# Patient Record
Sex: FEMALE | Race: ASIAN | NOT HISPANIC OR LATINO | Employment: UNEMPLOYED | URBAN - METROPOLITAN AREA
[De-identification: names, ages, dates, MRNs, and addresses within clinical notes are randomized per-mention and may not be internally consistent; named-entity substitution may affect disease eponyms.]

---

## 2019-06-18 PROBLEM — J30.1 SEASONAL ALLERGIC RHINITIS DUE TO POLLEN: Status: ACTIVE | Noted: 2019-06-18

## 2019-06-18 PROBLEM — J34.3 NASAL TURBINATE HYPERTROPHY: Status: ACTIVE | Noted: 2019-06-18

## 2019-06-18 PROBLEM — J35.1 TONSILLAR HYPERTROPHY: Status: ACTIVE | Noted: 2019-06-18

## 2019-06-18 PROBLEM — J34.89 NASAL OBSTRUCTION: Status: ACTIVE | Noted: 2019-06-18

## 2019-06-18 PROBLEM — J34.2 DEVIATED NASAL SEPTUM: Status: ACTIVE | Noted: 2019-06-18

## 2019-06-18 PROBLEM — R05.9 COUGH: Status: ACTIVE | Noted: 2019-06-18

## 2019-06-18 PROBLEM — J45.991 COUGH VARIANT ASTHMA: Status: ACTIVE | Noted: 2019-06-18

## 2022-05-31 ENCOUNTER — TELEPHONE (OUTPATIENT)
Dept: MATERNAL FETAL MEDICINE | Facility: CLINIC | Age: 32
End: 2022-05-31
Payer: COMMERCIAL

## 2022-05-31 NOTE — TELEPHONE ENCOUNTER
Called and left a voicemail to schedule the pt for a virtual consult with Eileen re: abdominal cerclage.

## 2022-06-06 ENCOUNTER — TELEPHONE (OUTPATIENT)
Dept: MATERNAL FETAL MEDICINE | Facility: CLINIC | Age: 32
End: 2022-06-06
Payer: COMMERCIAL

## 2022-06-24 ENCOUNTER — PATIENT MESSAGE (OUTPATIENT)
Dept: MATERNAL FETAL MEDICINE | Facility: CLINIC | Age: 32
End: 2022-06-24
Payer: COMMERCIAL

## 2022-07-26 ENCOUNTER — OFFICE VISIT (OUTPATIENT)
Dept: MATERNAL FETAL MEDICINE | Facility: CLINIC | Age: 32
End: 2022-07-26
Payer: COMMERCIAL

## 2022-07-26 ENCOUNTER — TELEPHONE (OUTPATIENT)
Dept: OBSTETRICS AND GYNECOLOGY | Facility: CLINIC | Age: 32
End: 2022-07-26
Payer: COMMERCIAL

## 2022-07-26 DIAGNOSIS — Z31.69 ENCOUNTER FOR PRECONCEPTION CONSULTATION: ICD-10-CM

## 2022-07-26 PROCEDURE — 99203 OFFICE O/P NEW LOW 30 MIN: CPT | Mod: 95,,, | Performed by: OBSTETRICS & GYNECOLOGY

## 2022-07-26 PROCEDURE — 99203 PR OFFICE/OUTPT VISIT, NEW, LEVL III, 30-44 MIN: ICD-10-PCS | Mod: 95,,, | Performed by: OBSTETRICS & GYNECOLOGY

## 2022-07-26 NOTE — PROGRESS NOTES
MATERNAL-FETAL MEDICINE   CONSULT NOTE    Provider requesting consultation: Dr. Andria Mascorro    SUBJECTIVE:     Ms. Franko Moore is a 31 y.o.  female with IUP at Unknown who is seen in consultation by MFM for evaluation and management of:  Problem   Preconception consultation for cervical insufficiency            No current medications    Review of patient's allergies indicates:  No Known Allergies    PMH:past history of asthma    PObHx:  OB History    Para Term  AB Living   2 1 0 1 1 1   SAB IAB Ectopic Multiple Live Births           1      # Outcome Date GA Lbr Zaid/2nd Weight Sex Delivery Anes PTL Lv   2  2018 28w5d  1.049 kg (2 lb 5 oz) M Vag-Spont  Y KENDELL      Birth Comments: Had cervical dilation at 15 weeks. Rescue cerclage placed. Developed PPROM at 26 weeks. Labored and delivered at 28 weeks   1 AB 2016 16w0d       ND      Birth Comments: Presented with cervical dilation and delivered before cerclage could be placed       PSH:  Past Surgical History:   Procedure Laterality Date    CERVICAL CERCLAGE  2018    Placed at 15 weeks due to cervical shortening and dilation         Social history: reports that she has never smoked. She has never used smokeless tobacco. She reports that she does not drink alcohol and does not use drugs.    Genetic history: The patient denies any inherited genetic diseases or birth defects in herself or her partner's personal history or family.        ASSESSMENT/PLAN:     31 y.o.  female with IUP at Unknown     Preconception consultation for cervical insufficiency  The patient was seen in consultation for pre conception counseling regarding cervical insufficiency and recurrent mid trimester pregnancy complications.    Her 1st pregnancy was in 2016. At approximately 16 weeks of gestation she developed spotting and was found to be several cm dilated.  She had not had any contractions prior to that.  She was sent to Labor and delivery for monitoring  and consideration of a cerclage, but before the cerclage could be placed, she ended up delivering.  The only time that she had contractions was just prior to delivery.  Her 2nd pregnancy was in 2018 and in that pregnancy at approximately 15 weeks of gestation was noted to have cervical shortening and dilation.  She underwent a rescue cerclage placement and was started on 17-P injections weekly.  She was also placed on bed rest.  At 26 weeks of gestation she experienced premature rupture of membranes.  She was admitted for expectant management and ultimately delivered at 28 weeks and 5 days her son weighed 2 lb and 5 oz and despite a prolonged NICU course has overall done fairly well.  He does still have some sequelae of prematurity but he is hitting all of his developmental milestones.    We discussed that based on her history she clearly has cervical insufficiency.  We discussed options for further management.  We discussed that one option would be placement of a prophylactic Martins cerclage at 12 to 14 weeks.  I explained that this is an option that we would expect to have a success rate of around 70% based on her history, but there does remain a failure rate and it is no guarantee of a term delivery.  I explained that the advantage of a vaginal approach is less surgical morbidity at the time of placement as well as less morbidity should the sutures need to be removed due to pregnancy complications or  labor.  An alternative approach would be placement of an abdominal cerclage.  I explained that this is associated with an increased risk of surgical morbidity because it typically involves 2 surgical procedures-1 to place the suture in 1 for delivery or suture removal.  We also discussed placement of the abdominal cerclage during pregnancy via an open approach versus interval placement of the abdominal cerclage via a robotic/minimally invasive approach.  We discussed surgical risks such as bleeding,  infection, damage to the surrounding viscera as well as the potential for adhesion formation.  I also explained that I typically advise patient to have an abdominal cerclage that achieves a gestational age of at least 30 weeks to leave the cerclage in place until they are done with childbearing.  Based on the results of the MAVRIC study, I explained her that an abdominal cerclage has approximately an 80% success rate.  I explained however that she is a bit different than patients who were included in that study because she has had only 1 failed cerclage, and it was a rescue cerclage.    We discussed at length both options.  I explained that while she does not meet firm criteria for an abdominal cerclage, given her history of such early and significant cervical dilation as well as the fact that even despite cerclage placement she still had mid trimester pregnancy complications, abdominal cerclage is reasonable to consider.  Furthermore, when you consider all of the stress involved in both prior pregnancies and the stress of having a 28 week infant, her desire for a more aggressive approach with abdominal cerclage is certainly justifiable and within a reasonable standard.  After considering all of her options and doing extensive research, she has decided that she would like to proceed with an abdominal cerclage placed during the inter pregnancy interval and I am going to get her set up to see Dr. Devi Soriano to arrange for robotic cerclage placement.    I also explained to her that while the data are mixed, I would recommend beginning vaginal progesterone 200 mg at 16 weeks as an alternative to 17P in any future pregnancy based on the recent meta analysis that suggests slightly more benefit with vaginal as opposed to intramuscular progesterone in the setting of cervical shortening.          Each patient to whom he or she provides medical services by telemedicine is:  (1) informed of the relationship between the  physician and patient and the respective role of any other health care provider with respect to management of the patient; and (2) notified that he or she may decline to receive medical services by telemedicine and may withdraw from such care at any time.    Telemedicine MFM Ultrasound Note    Consultation started:  07/26/2022  at 1000  The chief complaint leading to consultation is: cervical insufficiency  The patient location is: home     Consultation ended: 07/26/2022 at 1025.    Total time spent with patient: < 30 Minutes    Consulting clinician was informed of the encounter and consult note.    45 minutes of total time spent on the encounter, which includes face to face time and non-face to face time preparing to see the patient (eg, review of tests), obtaining and/or reviewing separately obtained history, documenting clinical information in the electronic or other health record, independently interpreting results (not separately reported) and communicating results to the patient/family/caregiver, or care coordination (not separately reported).      Mohan Arellano  Maternal-Fetal Medicine    Electronically Signed by Mohan Arellano July 26, 2022

## 2022-07-26 NOTE — ASSESSMENT & PLAN NOTE
The patient was seen in consultation for pre conception counseling regarding cervical insufficiency and recurrent mid trimester pregnancy complications.    Her 1st pregnancy was in 2016. At approximately 16 weeks of gestation she developed spotting and was found to be several cm dilated.  She had not had any contractions prior to that.  She was sent to Labor and delivery for monitoring and consideration of a cerclage, but before the cerclage could be placed, she ended up delivering.  The only time that she had contractions was just prior to delivery.  Her 2nd pregnancy was in 2018 and in that pregnancy at approximately 15 weeks of gestation was noted to have cervical shortening and dilation.  She underwent a rescue cerclage placement and was started on 17-P injections weekly.  She was also placed on bed rest.  At 26 weeks of gestation she experienced premature rupture of membranes.  She was admitted for expectant management and ultimately delivered at 28 weeks and 5 days her son weighed 2 lb and 5 oz and despite a prolonged NICU course has overall done fairly well.  He does still have some sequelae of prematurity but he is hitting all of his developmental milestones.    We discussed that based on her history she clearly has cervical insufficiency.  We discussed options for further management.  We discussed that one option would be placement of a prophylactic Martins cerclage at 12 to 14 weeks.  I explained that this is an option that we would expect to have a success rate of around 70% based on her history, but there does remain a failure rate and it is no guarantee of a term delivery.  I explained that the advantage of a vaginal approach is less surgical morbidity at the time of placement as well as less morbidity should the sutures need to be removed due to pregnancy complications or  labor.  An alternative approach would be placement of an abdominal cerclage.  I explained that this is associated with an  increased risk of surgical morbidity because it typically involves 2 surgical procedures-1 to place the suture in 1 for delivery or suture removal.  We also discussed placement of the abdominal cerclage during pregnancy via an open approach versus interval placement of the abdominal cerclage via a robotic/minimally invasive approach.  We discussed surgical risks such as bleeding, infection, damage to the surrounding viscera as well as the potential for adhesion formation.  I also explained that I typically advise patient to have an abdominal cerclage that achieves a gestational age of at least 30 weeks to leave the cerclage in place until they are done with childbearing.  Based on the results of the MAVRIC study, I explained her that an abdominal cerclage has approximately an 80% success rate.  I explained however that she is a bit different than patients who were included in that study because she has had only 1 failed cerclage, and it was a rescue cerclage.    We discussed at length both options.  I explained that while she does not meet firm criteria for an abdominal cerclage, given her history of such early and significant cervical dilation as well as the fact that even despite cerclage placement she still had mid trimester pregnancy complications, abdominal cerclage is reasonable to consider.  Furthermore, when you consider all of the stress involved in both prior pregnancies and the stress of having a 28 week infant, her desire for a more aggressive approach with abdominal cerclage is certainly justifiable and within a reasonable standard.  After considering all of her options and doing extensive research, she has decided that she would like to proceed with an abdominal cerclage placed during the inter pregnancy interval and I am going to get her set up to see Dr. Devi Soriano to arrange for robotic cerclage placement.    I also explained to her that while the data are mixed, I would recommend beginning vaginal  progesterone 200 mg at 16 weeks as an alternative to 17P in any future pregnancy based on the recent meta analysis that suggests slightly more benefit with vaginal as opposed to intramuscular progesterone in the setting of cervical shortening.

## 2022-08-23 ENCOUNTER — OFFICE VISIT (OUTPATIENT)
Dept: OBSTETRICS AND GYNECOLOGY | Facility: CLINIC | Age: 32
End: 2022-08-23
Payer: COMMERCIAL

## 2022-08-23 DIAGNOSIS — Z87.51 HISTORY OF PRETERM DELIVERY: ICD-10-CM

## 2022-08-23 DIAGNOSIS — N88.3 CERVICAL INCOMPETENCE: Primary | ICD-10-CM

## 2022-08-23 PROCEDURE — 99214 OFFICE O/P EST MOD 30 MIN: CPT | Mod: 95,,, | Performed by: OBSTETRICS & GYNECOLOGY

## 2022-08-23 PROCEDURE — 99214 PR OFFICE/OUTPT VISIT, EST, LEVL IV, 30-39 MIN: ICD-10-PCS | Mod: 95,,, | Performed by: OBSTETRICS & GYNECOLOGY

## 2022-08-23 NOTE — PROGRESS NOTES
The patient location is:  home ([place], [place])  The chief complaint leading to consultation is:  Poor obstetric history/history of cervical incompetence and  cervical dilation    Visit type: audiovisual    Face to Face time with patient: 25  35 minutes of total time spent on the encounter, which includes face to face time and non-face to face time preparing to see the patient (eg, review of tests), Obtaining and/or reviewing separately obtained history, Documenting clinical information in the electronic or other health record, Independently interpreting results (not separately reported) and communicating results to the patient/family/caregiver, or Care coordination (not separately reported).     Each patient to whom he or she provides medical services by telemedicine is:  (1) informed of the relationship between the physician and patient and the respective role of any other health care provider with respect to management of the patient; and (2) notified that he or she may decline to receive medical services by telemedicine and may withdraw from such care at any time.    Notes:     Consultation Note:    Reason for Consultation:  Evaluate for interval placement/minimally invasive abdominal cerclage    Consulting Physician:  Dr. Mohan Arellano, Dr. Andria Mascorro     Franko Moore is a 31 y.o.   patient who presents via telemedicine visit for discussion of risk and benefits of minimally invasive interval abdominal cerclage placement.   Patient with poor obstetric history including cervical dilatation at 15 weeks followed by rescue cerclage with PPROM at 26 weeks gestation followed by delivery at 28 weeks gestation.  This delivery resulted in an infant boy weighing 2 lb and 5 oz.  this  had a long/prolonged NICU stay and has done well overall.    Chart Reviewed/Dr. Arellano's consultation report reviewed    No past medical history on file.  Past Surgical History:   Procedure Laterality Date     CERVICAL CERCLAGE  2018    Placed at 15 weeks due to cervical shortening and dilation     Social History     Socioeconomic History    Marital status:    Tobacco Use    Smoking status: Never Smoker    Smokeless tobacco: Never Used   Substance and Sexual Activity    Alcohol use: Never    Drug use: Never     Family History   Problem Relation Age of Onset    Genetic Disorder Neg Hx     Chromosomal disorder Neg Hx     Birth defects Neg Hx      OB History        2    Para   1    Term   0       1    AB   1    Living   1       SAB        IAB        Ectopic        Multiple        Live Births   1                 There were no vitals taken for this visit.      ROS:  GENERAL: Denies weight gain or weight loss. Feeling well overall.   SKIN: Denies rash or lesions.   HEAD: Denies head injury or headache.   NODES: Denies enlarged lymph nodes.   CHEST: Denies chest pain or shortness of breath.   CARDIOVASCULAR: Denies palpitations or left sided chest pain.   ABDOMEN: No abdominal pain, constipation, diarrhea, nausea, vomiting or rectal bleeding.   URINARY: No frequency, dysuria, hematuria, or burning on urination.  REPRODUCTIVE: See HPI.   BREASTS: The patient performs breast self-examination and denies pain, lumps, or nipple discharge.   HEMATOLOGIC: No easy bruisability or excessive bleeding.   MUSCULOSKELETAL: Denies joint pain or swelling.   NEUROLOGIC: Denies syncope or weakness.   PSYCHIATRIC: Denies depression, anxiety or mood swings.    PHYSICAL EXAM:  Deferred/telemedicine visit    Impression:  1. Cervical incompetence     2. History of  delivery         Plan:  Discussed interval robotic assisted abdominal cerclage placement versus abdominal cerclage placement.  I have recommended Interval placement before pregnancy if desired.  Once pregnant, abdominal (laparotomy) cerclage placement would be only option.      Minimally invasive cerclage placement procedure reviewed in detail with  patient.  Patient verbalized understanding of this discussion and all questions were answered.    Consulting Physician to be notified of above findings/plan.    Billing based on face-to-face consultation time/telemedicine visit/chart reviewing chart completion: 35 minutes.    Lalo Soriano Iv, MD

## 2022-08-30 ENCOUNTER — TELEPHONE (OUTPATIENT)
Dept: OBSTETRICS AND GYNECOLOGY | Facility: CLINIC | Age: 32
End: 2022-08-30
Payer: COMMERCIAL

## 2023-12-26 ENCOUNTER — PATIENT MESSAGE (OUTPATIENT)
Dept: OBSTETRICS AND GYNECOLOGY | Facility: CLINIC | Age: 33
End: 2023-12-26
Payer: COMMERCIAL

## 2023-12-29 ENCOUNTER — TELEPHONE (OUTPATIENT)
Dept: OBSTETRICS AND GYNECOLOGY | Facility: CLINIC | Age: 33
End: 2023-12-29
Payer: COMMERCIAL

## 2023-12-29 DIAGNOSIS — N88.3 INCOMPETENCE OF CERVIX: Primary | ICD-10-CM

## 2023-12-29 DIAGNOSIS — Z87.51 PERSONAL HISTORY OF PRE-TERM LABOR: ICD-10-CM

## 2023-12-29 NOTE — TELEPHONE ENCOUNTER
----- Message from Fannie Ibrahim MA sent at 12/28/2023  3:10 PM CST -----  Schedule abdominal cerclage on 3/18/2024   Pre op and pre admit appt made

## 2024-01-02 ENCOUNTER — TELEPHONE (OUTPATIENT)
Dept: OBSTETRICS AND GYNECOLOGY | Facility: CLINIC | Age: 34
End: 2024-01-02
Payer: COMMERCIAL

## 2024-02-19 ENCOUNTER — PATIENT MESSAGE (OUTPATIENT)
Dept: MATERNAL FETAL MEDICINE | Facility: CLINIC | Age: 34
End: 2024-02-19
Payer: COMMERCIAL

## 2024-03-11 ENCOUNTER — ANESTHESIA EVENT (OUTPATIENT)
Dept: SURGERY | Facility: OTHER | Age: 34
End: 2024-03-11
Payer: COMMERCIAL

## 2024-03-11 RX ORDER — LIDOCAINE HYDROCHLORIDE 10 MG/ML
0.5 INJECTION, SOLUTION EPIDURAL; INFILTRATION; INTRACAUDAL; PERINEURAL ONCE
Status: CANCELLED | OUTPATIENT
Start: 2024-03-11 | End: 2024-03-11

## 2024-03-11 RX ORDER — ACETAMINOPHEN 500 MG
1000 TABLET ORAL
Status: CANCELLED | OUTPATIENT
Start: 2024-03-11 | End: 2024-03-11

## 2024-03-11 RX ORDER — SODIUM CHLORIDE, SODIUM LACTATE, POTASSIUM CHLORIDE, CALCIUM CHLORIDE 600; 310; 30; 20 MG/100ML; MG/100ML; MG/100ML; MG/100ML
INJECTION, SOLUTION INTRAVENOUS CONTINUOUS
Status: CANCELLED | OUTPATIENT
Start: 2024-03-11

## 2024-03-11 RX ORDER — PREGABALIN 75 MG/1
75 CAPSULE ORAL ONCE
Status: CANCELLED | OUTPATIENT
Start: 2024-03-11 | End: 2024-03-11

## 2024-03-12 ENCOUNTER — HOSPITAL ENCOUNTER (OUTPATIENT)
Dept: PREADMISSION TESTING | Facility: OTHER | Age: 34
Discharge: HOME | End: 2024-03-12
Attending: OBSTETRICS & GYNECOLOGY
Payer: COMMERCIAL

## 2024-03-12 ENCOUNTER — OFFICE VISIT (OUTPATIENT)
Dept: OBSTETRICS AND GYNECOLOGY | Facility: CLINIC | Age: 34
End: 2024-03-12
Payer: COMMERCIAL

## 2024-03-12 ENCOUNTER — PATIENT MESSAGE (OUTPATIENT)
Dept: OBSTETRICS AND GYNECOLOGY | Facility: CLINIC | Age: 34
End: 2024-03-12

## 2024-03-12 VITALS
WEIGHT: 180 LBS | SYSTOLIC BLOOD PRESSURE: 138 MMHG | TEMPERATURE: 98 F | HEART RATE: 77 BPM | OXYGEN SATURATION: 100 % | DIASTOLIC BLOOD PRESSURE: 74 MMHG | BODY MASS INDEX: 29.99 KG/M2 | HEIGHT: 65 IN | RESPIRATION RATE: 19 BRPM

## 2024-03-12 VITALS
SYSTOLIC BLOOD PRESSURE: 124 MMHG | HEIGHT: 65 IN | DIASTOLIC BLOOD PRESSURE: 92 MMHG | BODY MASS INDEX: 30.3 KG/M2 | WEIGHT: 181.88 LBS

## 2024-03-12 DIAGNOSIS — N88.3 INCOMPETENCE OF CERVIX: Primary | ICD-10-CM

## 2024-03-12 DIAGNOSIS — Z87.59: ICD-10-CM

## 2024-03-12 PROCEDURE — 99214 OFFICE O/P EST MOD 30 MIN: CPT | Mod: S$GLB,,, | Performed by: OBSTETRICS & GYNECOLOGY

## 2024-03-12 PROCEDURE — 3008F BODY MASS INDEX DOCD: CPT | Mod: CPTII,S$GLB,, | Performed by: OBSTETRICS & GYNECOLOGY

## 2024-03-12 PROCEDURE — 99999 PR PBB SHADOW E&M-EST. PATIENT-LVL III: CPT | Mod: PBBFAC,,, | Performed by: OBSTETRICS & GYNECOLOGY

## 2024-03-12 PROCEDURE — 1159F MED LIST DOCD IN RCRD: CPT | Mod: CPTII,S$GLB,, | Performed by: OBSTETRICS & GYNECOLOGY

## 2024-03-12 PROCEDURE — 3080F DIAST BP >= 90 MM HG: CPT | Mod: CPTII,S$GLB,, | Performed by: OBSTETRICS & GYNECOLOGY

## 2024-03-12 PROCEDURE — 3074F SYST BP LT 130 MM HG: CPT | Mod: CPTII,S$GLB,, | Performed by: OBSTETRICS & GYNECOLOGY

## 2024-03-12 RX ORDER — SPIRONOLACTONE 100 MG/1
100 TABLET, FILM COATED ORAL NIGHTLY
Status: ON HOLD | COMMUNITY
End: 2024-03-18 | Stop reason: HOSPADM

## 2024-03-12 RX ORDER — FAMOTIDINE 20 MG/1
20 TABLET, FILM COATED ORAL
Status: CANCELLED | OUTPATIENT
Start: 2024-03-18

## 2024-03-12 RX ORDER — SODIUM CHLORIDE 9 MG/ML
INJECTION, SOLUTION INTRAVENOUS CONTINUOUS
Status: CANCELLED | OUTPATIENT
Start: 2024-03-18

## 2024-03-12 RX ORDER — ACETAMINOPHEN 500 MG
1000 TABLET ORAL
Status: CANCELLED | OUTPATIENT
Start: 2024-03-18

## 2024-03-12 RX ORDER — NORETHINDRONE ACETATE AND ETHINYL ESTRADIOL AND FERROUS FUMARATE 1MG-20(24)
1 KIT ORAL
COMMUNITY
Start: 2024-03-04

## 2024-03-12 NOTE — ANESTHESIA PREPROCEDURE EVALUATION
03/12/2024  Franko Moore is a 33 y.o., female.      Pre-op Assessment    I have reviewed the Patient Summary Reports.     I have reviewed the Nursing Notes. I have reviewed the NPO Status.   I have reviewed the Medications.     Review of Systems  Anesthesia Hx:  No problems with previous Anesthesia             Denies Family Hx of Anesthesia complications.    Denies Personal Hx of Anesthesia complications.                    Social:  Non-Smoker       Hematology/Oncology:  Hematology Normal   Oncology Normal                                   EENT/Dental:  EENT/Dental Normal           Cardiovascular:  Cardiovascular Normal                                            Pulmonary:  Pulmonary Normal                       Renal/:  Renal/ Normal                 Hepatic/GI:  Hepatic/GI Normal                 Musculoskeletal:  Musculoskeletal Normal                Neurological:  Neurology Normal                                      Endocrine:  Endocrine Normal            Dermatological:  Skin Normal    Psych:  Psychiatric Normal                  Physical Exam  General: Well nourished and Alert    Airway:  Mallampati: II   Mouth Opening: Normal  Tongue: Normal    Dental:  Intact      Anesthesia Plan  Type of Anesthesia, risks & benefits discussed:    Anesthesia Type: Gen ETT  Intra-op Monitoring Plan: Standard ASA Monitors  Post Op Pain Control Plan: multimodal analgesia  Induction:  IV  Airway Plan: Video  Informed Consent: Informed consent signed with the Patient and all parties understand the risks and agree with anesthesia plan.  All questions answered.   ASA Score: 1    Ready For Surgery From Anesthesia Perspective.     .

## 2024-03-12 NOTE — DISCHARGE INSTRUCTIONS
Information to Prepare you for your Surgery    PRE-ADMIT TESTING -  [phone]    [address]  [facility]          Your surgery has been scheduled at [facility]. We are pleased to have the opportunity to serve you. For Further Information please call [phone].    On the day of surgery please report to the Information Desk on the 1st floor.    CONTACT YOUR PHYSICIAN'S OFFICE THE DAY PRIOR TO YOUR SURGERY TO OBTAIN YOUR ARRIVAL TIME.     The evening before surgery do not eat anything after 9 p.m. ( this includes hard candy, chewing gum and mints).  You may only have GATORADE, POWERADE AND WATER  from 9 p.m. until you leave your home.   DO NOT DRINK ANY LIQUIDS ON THE WAY TO THE HOSPITAL.      Why does your anesthesiologist allow you to drink Gatorade/Powerade before surgery?  Gatorade/Powerade helps to increase your comfort before surgery and to decrease your nausea after surgery. The carbohydrates in Gatorade/Powerade help reduce your body's stress response to surgery.  If you are a diabetic-drink only water prior to surgery.    Outpatient Surgery- May allow 2 adult (18 and older) Support Persons (1 being the designated ) for all surgical/procedural patients. A breastfeeding mother will be allowed her infant and 2 adult Support Persons. No one under the age of 18 will be allowed in the building. No swapping out of visitors in the [facility].      SPECIAL MEDICATION INSTRUCTIONS: TAKE medications checked off by the Anesthesiologist on your Medication List.    Angiogram Patients: Take medications as instructed by your physician, including aspirin.     Surgery Patients:    If you take ASPIRIN - Your PHYSICIAN/SURGEON will need to inform you IF/OR when you need to stop taking aspirin prior to your surgery.     The week prior to surgery do not ot take any medications containing IBUPROFEN or NSAIDS ( Advil, Motrin, Goodys, BC, Aleve, Naproxen etc)  If you are not sure if you should take a medicine please call your surgeon's office.  Ok to take Tylenol    Do Not Wear any make-up (especially eye make-up) to surgery. Please remove any false eyelashes or eyelash extensions. If you arrive the day of surgery with makeup/eyelashes on you will be required to remove prior to surgery. (There is a risk of corneal abrasions if eye makeup/eyelash extensions are not removed)      Leave all valuables at home.   Do Not wear any jewelry or watches, including any metal in body piercings. Jewelry must be removed prior to coming to the hospital.  There is a possibility that rings that are unable to be removed may be cut off if they are on the surgical extremity.    Please remove all hair extensions, wigs, clips and any other metal accessories/ ornaments from your hair.  These items may pose a flammable/fire risk in Surgery and must be removed.    Do not shave your surgical area at least 5 days prior to your surgery. The surgical prep will be performed at the hospital according to Infection Control regulations.    Contact Lens must be removed before surgery. Either do not wear the contact lens or bring a case and solution for storage.  Please bring a container for eyeglasses or dentures as required.  Bring any paperwork your physician has provided, such as consent forms,  history and physicals, doctor's orders, etc.   Bring comfortable clothes that are loose fitting to wear upon discharge. Take into consideration the type of surgery being performed.  Maintain your diet as advised per your physician the day prior to surgery.      Adequate rest the night before surgery is advised.   Park in the Parking lot behind the hospital or in the [facility] across the street from the parking lot. Parking is complimentary.  If you will be discharged the same day as your procedure, please arrange for a responsible adult to drive you home or to accompany you if traveling by taxi.    YOU WILL NOT BE PERMITTED TO DRIVE OR TO LEAVE THE HOSPITAL ALONE AFTER SURGERY.   If you are being discharged the same day, it is strongly recommended that you arrange for someone to remain with you for the first 24 hrs following your surgery.    The Surgeon will speak to your family/visitor after your surgery regarding the outcome of your surgery and post op care.  The Surgeon may speak to you after your surgery, but there is a possibility you may not remember the details.  Please check with your family members regarding the conversation with the Surgeon.    We strongly recommend whoever is bringing you home be present for discharge instructions.  This will ensure a thorough understanding for your post op home care.          Thank you for your cooperation.  The Staff of [facility].            Bathing Instructions with Hibiclens    Shower the evening before and morning of your procedure with Chlorhexidine (Hibiclens)  do not use Chlorhexidine on your face or genitals. Do not get in your eyes.  Wash your face with water and your regular face wash/soap  Use your regular shampoo  Apply Chlorhexidine (Hibiclens) directly on your skin or on a wet washcloth and wash gently. When showering: Move away from the shower stream when applying Chlorhexidine (Hibiclens) to avoid rinsing off too soon.  Rinse thoroughly with warm water  Do not dilute Chlorhexidine (Hibiclens)   Dry off as usual, do not use any deodorant, powder, body lotions, perfume, after shave or cologne.

## 2024-03-12 NOTE — PROGRESS NOTES
"CC:  History of incompetent cervix/second-trimester early  dilatation and delivery    HPI : Franko Moore is a 33 y.o.   for evaluation and discussion of treatment options for history of cervical dilatation at 15 weeks followed by a rescue cerclage.  Pregnancy ultimately was complicated by PPROM at 26 weeks' gestation followed by delivery at 28 weeks' gestation.  Patient presents to discuss the risks and benefits of minimally invasive abdominal cerclage patient (robotic).  No other gynecologic complaints today.    Patient is followed by Dr. Andria Mascorro at [facility] in [place]/Dr. Mohan Arellano in [place].    History reviewed. No pertinent past medical history.  Past Surgical History:   Procedure Laterality Date    CERVICAL CERCLAGE  2018    Placed at 15 weeks due to cervical shortening and dilation    VAGINAL DELIVERY      X 1 WITH EPIDURAL     Family History   Problem Relation Age of Onset    Genetic Disorder Neg Hx     Chromosomal disorder Neg Hx     Birth defects Neg Hx     Breast cancer Neg Hx     Colon cancer Neg Hx     Ovarian cancer Neg Hx      Social History     Tobacco Use    Smoking status: Never    Smokeless tobacco: Never   Substance Use Topics    Alcohol use: Never    Drug use: Never     OB History    Para Term  AB Living   2 1 0 1 1 1   SAB IAB Ectopic Multiple Live Births           1      # Outcome Date GA Lbr Zaid/2nd Weight Sex Delivery Anes PTL Lv   2  2018 28w5d  1.049 kg (2 lb 5 oz) M Vag-Spont  Y KENDELL      Birth Comments: Had cervical dilation at 15 weeks. Rescue cerclage placed. Developed PPROM at 26 weeks. Labored and delivered at 28 weeks   1 AB 2016 16w0d       ND      Birth Comments: Presented with cervical dilation and delivered before cerclage could be placed       BP (!) 124/92   Ht 5' 5" (1.651 m)   Wt 82.5 kg (181 lb 14.1 oz)   LMP 2024   BMI 30.27 kg/m²     ROS:  GENERAL: Feeling well overall.   SKIN: Denies rash or lesions. "   HEAD: Denies head injury or headache.   NODES: Denies enlarged lymph nodes.   CHEST: Denies chest pain or shortness of breath.   CARDIOVASCULAR: Denies palpitations or left sided chest pain.   ABDOMEN: No abdominal pain, constipation, diarrhea, nausea, vomiting or rectal bleeding.   URINARY: No frequency, dysuria, hematuria, or burning on urination.  REPRODUCTIVE: See HPI.   BREASTS: Denies pain, lumps, or nipple discharge.   HEMATOLOGIC: No easy bruisability.  MUSCULOSKELETAL: Denies joint pain or swelling.   NEUROLOGIC: Denies syncope or weakness.   PSYCHIATRIC: Denies depression, anxiety or mood swings.      PHYSICAL EXAM:  APPEARANCE: Well nourished, well developed, in no acute distress.  AFFECT: WNL, alert and oriented x 3  SKIN: No acne or hirsutism  NECK: Neck symmetric without masses or thyromegaly  NODES: No inguinal, cervical, axillary, or femoral lymph node enlargement  CHEST: Good respiratory effect  ABDOMEN: Soft.  No tenderness or masses.  No hepatosplenomegaly.  No hernias.  PELVIC: Normal external genitalia without lesions.  Normal hair distribution.  Adequate perineal body, normal urethral meatus.  Vagina moist and well rugated without lesions or discharge.  Cervix pink, without lesions, discharge or tenderness.  No significant cystocele or rectocele.  Bimanual exam shows uterus to be normal size, regular, mobile and nontender.  Adnexa without masses or tenderness.    EXTREMITIES: No edema.    ASSESSMENT & PLAN:  1. Incompetence of cervix    2. History of obstetric problem    I have discussed the risks, benefits, indications, and alternatives of the procedure in detail.  The patient verbalizes her understanding.  All questions answered.  Consents signed.  The patient agrees to proceed to surgery scheduling.    Plan robotic assisted abdominal cerclage placement with hysteroscopic evaluation post placement (to confirm no Mersilene tape in uterine cavity or cervical canal).    Lalo Soriano IV,  MD

## 2024-03-12 NOTE — H&P (VIEW-ONLY)
"CC:  History of incompetent cervix/second-trimester early  dilatation and delivery    HPI : Franko Moore is a 33 y.o.   for evaluation and discussion of treatment options for history of cervical dilatation at 15 weeks followed by a rescue cerclage.  Pregnancy ultimately was complicated by PPROM at 26 weeks' gestation followed by delivery at 28 weeks' gestation.  Patient presents to discuss the risks and benefits of minimally invasive abdominal cerclage patient (robotic).  No other gynecologic complaints today.    Patient is followed by Dr. Andria Mascorro at [facility] in [place]/Dr. Mohan Arellano in [place].    History reviewed. No pertinent past medical history.  Past Surgical History:   Procedure Laterality Date    CERVICAL CERCLAGE  2018    Placed at 15 weeks due to cervical shortening and dilation    VAGINAL DELIVERY      X 1 WITH EPIDURAL     Family History   Problem Relation Age of Onset    Genetic Disorder Neg Hx     Chromosomal disorder Neg Hx     Birth defects Neg Hx     Breast cancer Neg Hx     Colon cancer Neg Hx     Ovarian cancer Neg Hx      Social History     Tobacco Use    Smoking status: Never    Smokeless tobacco: Never   Substance Use Topics    Alcohol use: Never    Drug use: Never     OB History    Para Term  AB Living   2 1 0 1 1 1   SAB IAB Ectopic Multiple Live Births           1      # Outcome Date GA Lbr Zaid/2nd Weight Sex Delivery Anes PTL Lv   2  2018 28w5d  1.049 kg (2 lb 5 oz) M Vag-Spont  Y KENDELL      Birth Comments: Had cervical dilation at 15 weeks. Rescue cerclage placed. Developed PPROM at 26 weeks. Labored and delivered at 28 weeks   1 AB 2016 16w0d       ND      Birth Comments: Presented with cervical dilation and delivered before cerclage could be placed       BP (!) 124/92   Ht 5' 5" (1.651 m)   Wt 82.5 kg (181 lb 14.1 oz)   LMP 2024   BMI 30.27 kg/m²     ROS:  GENERAL: Feeling well overall.   SKIN: Denies rash or lesions. "   HEAD: Denies head injury or headache.   NODES: Denies enlarged lymph nodes.   CHEST: Denies chest pain or shortness of breath.   CARDIOVASCULAR: Denies palpitations or left sided chest pain.   ABDOMEN: No abdominal pain, constipation, diarrhea, nausea, vomiting or rectal bleeding.   URINARY: No frequency, dysuria, hematuria, or burning on urination.  REPRODUCTIVE: See HPI.   BREASTS: Denies pain, lumps, or nipple discharge.   HEMATOLOGIC: No easy bruisability.  MUSCULOSKELETAL: Denies joint pain or swelling.   NEUROLOGIC: Denies syncope or weakness.   PSYCHIATRIC: Denies depression, anxiety or mood swings.      PHYSICAL EXAM:  APPEARANCE: Well nourished, well developed, in no acute distress.  AFFECT: WNL, alert and oriented x 3  SKIN: No acne or hirsutism  NECK: Neck symmetric without masses or thyromegaly  NODES: No inguinal, cervical, axillary, or femoral lymph node enlargement  CHEST: Good respiratory effect  ABDOMEN: Soft.  No tenderness or masses.  No hepatosplenomegaly.  No hernias.  PELVIC: Normal external genitalia without lesions.  Normal hair distribution.  Adequate perineal body, normal urethral meatus.  Vagina moist and well rugated without lesions or discharge.  Cervix pink, without lesions, discharge or tenderness.  No significant cystocele or rectocele.  Bimanual exam shows uterus to be normal size, regular, mobile and nontender.  Adnexa without masses or tenderness.    EXTREMITIES: No edema.    ASSESSMENT & PLAN:  1. Incompetence of cervix    2. History of obstetric problem    I have discussed the risks, benefits, indications, and alternatives of the procedure in detail.  The patient verbalizes her understanding.  All questions answered.  Consents signed.  The patient agrees to proceed to surgery scheduling.    Plan robotic assisted abdominal cerclage placement with hysteroscopic evaluation post placement (to confirm no Mersilene tape in uterine cavity or cervical canal).    Lalo Soriano IV,  MD

## 2024-03-15 ENCOUNTER — PATIENT MESSAGE (OUTPATIENT)
Dept: OBSTETRICS AND GYNECOLOGY | Facility: CLINIC | Age: 34
End: 2024-03-15
Payer: COMMERCIAL

## 2024-03-18 ENCOUNTER — HOSPITAL ENCOUNTER (OUTPATIENT)
Facility: OTHER | Age: 34
Discharge: HOME | End: 2024-03-18
Attending: OBSTETRICS & GYNECOLOGY | Admitting: OBSTETRICS & GYNECOLOGY
Payer: COMMERCIAL

## 2024-03-18 ENCOUNTER — ANESTHESIA (OUTPATIENT)
Dept: SURGERY | Facility: OTHER | Age: 34
End: 2024-03-18
Payer: COMMERCIAL

## 2024-03-18 VITALS
HEIGHT: 65 IN | HEART RATE: 72 BPM | SYSTOLIC BLOOD PRESSURE: 125 MMHG | RESPIRATION RATE: 18 BRPM | BODY MASS INDEX: 30.16 KG/M2 | OXYGEN SATURATION: 100 % | TEMPERATURE: 98 F | DIASTOLIC BLOOD PRESSURE: 69 MMHG | WEIGHT: 181 LBS

## 2024-03-18 DIAGNOSIS — Z87.59: ICD-10-CM

## 2024-03-18 DIAGNOSIS — Z98.890 STATUS POST ROBOT-ASSISTED SURGICAL PROCEDURE: Primary | ICD-10-CM

## 2024-03-18 DIAGNOSIS — N88.3 INCOMPETENCE OF CERVIX: ICD-10-CM

## 2024-03-18 LAB
B-HCG UR QL: NEGATIVE
CTP QC/QA: YES

## 2024-03-18 PROCEDURE — 36000710: Performed by: OBSTETRICS & GYNECOLOGY

## 2024-03-18 PROCEDURE — 63600175 PHARM REV CODE 636 W HCPCS: Performed by: STUDENT IN AN ORGANIZED HEALTH CARE EDUCATION/TRAINING PROGRAM

## 2024-03-18 PROCEDURE — 37000009 HC ANESTHESIA EA ADD 15 MINS: Performed by: OBSTETRICS & GYNECOLOGY

## 2024-03-18 PROCEDURE — 25000003 PHARM REV CODE 250: Performed by: ANESTHESIOLOGY

## 2024-03-18 PROCEDURE — D9220A PRA ANESTHESIA: Mod: ANES,,, | Performed by: ANESTHESIOLOGY

## 2024-03-18 PROCEDURE — 37000008 HC ANESTHESIA 1ST 15 MINUTES: Performed by: OBSTETRICS & GYNECOLOGY

## 2024-03-18 PROCEDURE — 71000039 HC RECOVERY, EACH ADD'L HOUR: Performed by: OBSTETRICS & GYNECOLOGY

## 2024-03-18 PROCEDURE — 25000003 PHARM REV CODE 250: Performed by: NURSE ANESTHETIST, CERTIFIED REGISTERED

## 2024-03-18 PROCEDURE — 63600175 PHARM REV CODE 636 W HCPCS: Performed by: ANESTHESIOLOGY

## 2024-03-18 PROCEDURE — D9220A PRA ANESTHESIA: Mod: CRNA,,, | Performed by: STUDENT IN AN ORGANIZED HEALTH CARE EDUCATION/TRAINING PROGRAM

## 2024-03-18 PROCEDURE — 71000015 HC POSTOP RECOV 1ST HR: Performed by: OBSTETRICS & GYNECOLOGY

## 2024-03-18 PROCEDURE — 88304 TISSUE EXAM BY PATHOLOGIST: CPT | Mod: 26,,, | Performed by: PATHOLOGY

## 2024-03-18 PROCEDURE — 58555 HYSTEROSCOPY DX SEP PROC: CPT | Mod: 59,,, | Performed by: OBSTETRICS & GYNECOLOGY

## 2024-03-18 PROCEDURE — 81025 URINE PREGNANCY TEST: CPT | Performed by: ANESTHESIOLOGY

## 2024-03-18 PROCEDURE — 71000016 HC POSTOP RECOV ADDL HR: Performed by: OBSTETRICS & GYNECOLOGY

## 2024-03-18 PROCEDURE — 27201423 OPTIME MED/SURG SUP & DEVICES STERILE SUPPLY: Performed by: OBSTETRICS & GYNECOLOGY

## 2024-03-18 PROCEDURE — 36000711: Performed by: OBSTETRICS & GYNECOLOGY

## 2024-03-18 PROCEDURE — 58662 LAPAROSCOPY EXCISE LESIONS: CPT | Mod: ,,, | Performed by: OBSTETRICS & GYNECOLOGY

## 2024-03-18 PROCEDURE — 63600175 PHARM REV CODE 636 W HCPCS: Performed by: NURSE ANESTHETIST, CERTIFIED REGISTERED

## 2024-03-18 PROCEDURE — 88304 TISSUE EXAM BY PATHOLOGIST: CPT | Performed by: PATHOLOGY

## 2024-03-18 PROCEDURE — 25000003 PHARM REV CODE 250: Performed by: STUDENT IN AN ORGANIZED HEALTH CARE EDUCATION/TRAINING PROGRAM

## 2024-03-18 PROCEDURE — 49329 UNLSTD LAPS PX ABD PERTM&OMN: CPT | Mod: ,,, | Performed by: OBSTETRICS & GYNECOLOGY

## 2024-03-18 PROCEDURE — 25000003 PHARM REV CODE 250: Performed by: OBSTETRICS & GYNECOLOGY

## 2024-03-18 PROCEDURE — 71000033 HC RECOVERY, INTIAL HOUR: Performed by: OBSTETRICS & GYNECOLOGY

## 2024-03-18 RX ORDER — FAMOTIDINE 20 MG/1
20 TABLET, FILM COATED ORAL
Status: COMPLETED | OUTPATIENT
Start: 2024-03-18 | End: 2024-03-18

## 2024-03-18 RX ORDER — LIDOCAINE HYDROCHLORIDE 20 MG/ML
INJECTION INTRAVENOUS
Status: DISCONTINUED | OUTPATIENT
Start: 2024-03-18 | End: 2024-03-18

## 2024-03-18 RX ORDER — DEXAMETHASONE SODIUM PHOSPHATE 4 MG/ML
INJECTION, SOLUTION INTRA-ARTICULAR; INTRALESIONAL; INTRAMUSCULAR; INTRAVENOUS; SOFT TISSUE
Status: DISCONTINUED | OUTPATIENT
Start: 2024-03-18 | End: 2024-03-18

## 2024-03-18 RX ORDER — OXYCODONE HYDROCHLORIDE 5 MG/1
5 TABLET ORAL EVERY 4 HOURS PRN
Status: DISCONTINUED | OUTPATIENT
Start: 2024-03-18 | End: 2024-03-18 | Stop reason: HOSPADM

## 2024-03-18 RX ORDER — SODIUM CHLORIDE 9 MG/ML
INJECTION, SOLUTION INTRAVENOUS CONTINUOUS
Status: DISCONTINUED | OUTPATIENT
Start: 2024-03-18 | End: 2024-03-18 | Stop reason: HOSPADM

## 2024-03-18 RX ORDER — PROCHLORPERAZINE EDISYLATE 5 MG/ML
5 INJECTION INTRAMUSCULAR; INTRAVENOUS EVERY 30 MIN PRN
Status: DISCONTINUED | OUTPATIENT
Start: 2024-03-18 | End: 2024-03-18 | Stop reason: HOSPADM

## 2024-03-18 RX ORDER — KETOROLAC TROMETHAMINE 30 MG/ML
INJECTION, SOLUTION INTRAMUSCULAR; INTRAVENOUS
Status: DISCONTINUED | OUTPATIENT
Start: 2024-03-18 | End: 2024-03-18

## 2024-03-18 RX ORDER — PROPOFOL 10 MG/ML
VIAL (ML) INTRAVENOUS
Status: DISCONTINUED | OUTPATIENT
Start: 2024-03-18 | End: 2024-03-18

## 2024-03-18 RX ORDER — MIDAZOLAM HYDROCHLORIDE 1 MG/ML
INJECTION INTRAMUSCULAR; INTRAVENOUS
Status: DISCONTINUED | OUTPATIENT
Start: 2024-03-18 | End: 2024-03-18

## 2024-03-18 RX ORDER — SODIUM CHLORIDE, SODIUM LACTATE, POTASSIUM CHLORIDE, CALCIUM CHLORIDE 600; 310; 30; 20 MG/100ML; MG/100ML; MG/100ML; MG/100ML
INJECTION, SOLUTION INTRAVENOUS CONTINUOUS
Status: DISCONTINUED | OUTPATIENT
Start: 2024-03-18 | End: 2024-03-18 | Stop reason: HOSPADM

## 2024-03-18 RX ORDER — HYDROMORPHONE HYDROCHLORIDE 2 MG/ML
0.4 INJECTION, SOLUTION INTRAMUSCULAR; INTRAVENOUS; SUBCUTANEOUS EVERY 5 MIN PRN
Status: DISCONTINUED | OUTPATIENT
Start: 2024-03-18 | End: 2024-03-18 | Stop reason: HOSPADM

## 2024-03-18 RX ORDER — DEXTROMETHORPHAN HYDROBROMIDE, GUAIFENESIN 5; 100 MG/5ML; MG/5ML
650 LIQUID ORAL
Qty: 60 TABLET | Refills: 1 | Status: SHIPPED | OUTPATIENT
Start: 2024-03-18 | End: 2024-04-30

## 2024-03-18 RX ORDER — DOCUSATE SODIUM 100 MG/1
100 CAPSULE, LIQUID FILLED ORAL 2 TIMES DAILY PRN
Qty: 60 CAPSULE | Refills: 0 | Status: SHIPPED | OUTPATIENT
Start: 2024-03-18 | End: 2024-04-30

## 2024-03-18 RX ORDER — FENTANYL CITRATE 50 UG/ML
INJECTION, SOLUTION INTRAMUSCULAR; INTRAVENOUS
Status: DISCONTINUED | OUTPATIENT
Start: 2024-03-18 | End: 2024-03-18

## 2024-03-18 RX ORDER — SODIUM CHLORIDE 0.9 % (FLUSH) 0.9 %
3 SYRINGE (ML) INJECTION
Status: DISCONTINUED | OUTPATIENT
Start: 2024-03-18 | End: 2024-03-18 | Stop reason: HOSPADM

## 2024-03-18 RX ORDER — ONDANSETRON 8 MG/1
8 TABLET, ORALLY DISINTEGRATING ORAL ONCE
Status: COMPLETED | OUTPATIENT
Start: 2024-03-18 | End: 2024-03-18

## 2024-03-18 RX ORDER — OXYCODONE HYDROCHLORIDE 5 MG/1
5 TABLET ORAL EVERY 4 HOURS PRN
Qty: 15 TABLET | Refills: 0 | Status: SHIPPED | OUTPATIENT
Start: 2024-03-18 | End: 2024-04-30

## 2024-03-18 RX ORDER — LIDOCAINE HYDROCHLORIDE 10 MG/ML
0.5 INJECTION, SOLUTION EPIDURAL; INFILTRATION; INTRACAUDAL; PERINEURAL ONCE
Status: DISCONTINUED | OUTPATIENT
Start: 2024-03-18 | End: 2024-03-18 | Stop reason: HOSPADM

## 2024-03-18 RX ORDER — MEPERIDINE HYDROCHLORIDE 25 MG/ML
12.5 INJECTION INTRAMUSCULAR; INTRAVENOUS; SUBCUTANEOUS ONCE AS NEEDED
Status: DISCONTINUED | OUTPATIENT
Start: 2024-03-18 | End: 2024-03-18 | Stop reason: HOSPADM

## 2024-03-18 RX ORDER — ONDANSETRON HYDROCHLORIDE 2 MG/ML
INJECTION, SOLUTION INTRAVENOUS
Status: DISCONTINUED | OUTPATIENT
Start: 2024-03-18 | End: 2024-03-18

## 2024-03-18 RX ORDER — ACETAMINOPHEN 500 MG
1000 TABLET ORAL
Status: COMPLETED | OUTPATIENT
Start: 2024-03-18 | End: 2024-03-18

## 2024-03-18 RX ORDER — ACETAMINOPHEN 500 MG
1000 TABLET ORAL
Status: DISCONTINUED | OUTPATIENT
Start: 2024-03-18 | End: 2024-03-18

## 2024-03-18 RX ORDER — DIPHENHYDRAMINE HYDROCHLORIDE 50 MG/ML
12.5 INJECTION INTRAMUSCULAR; INTRAVENOUS EVERY 30 MIN PRN
Status: DISCONTINUED | OUTPATIENT
Start: 2024-03-18 | End: 2024-03-18 | Stop reason: HOSPADM

## 2024-03-18 RX ORDER — ROCURONIUM BROMIDE 10 MG/ML
INJECTION, SOLUTION INTRAVENOUS
Status: DISCONTINUED | OUTPATIENT
Start: 2024-03-18 | End: 2024-03-18

## 2024-03-18 RX ORDER — DEXMEDETOMIDINE HYDROCHLORIDE 100 UG/ML
INJECTION, SOLUTION INTRAVENOUS
Status: DISCONTINUED | OUTPATIENT
Start: 2024-03-18 | End: 2024-03-18

## 2024-03-18 RX ORDER — IBUPROFEN 600 MG/1
600 TABLET ORAL
Qty: 60 TABLET | Refills: 1 | Status: SHIPPED | OUTPATIENT
Start: 2024-03-18 | End: 2024-04-30

## 2024-03-18 RX ORDER — PREGABALIN 75 MG/1
75 CAPSULE ORAL ONCE
Status: COMPLETED | OUTPATIENT
Start: 2024-03-18 | End: 2024-03-18

## 2024-03-18 RX ADMIN — DEXAMETHASONE SODIUM PHOSPHATE 8 MG: 4 INJECTION, SOLUTION INTRAMUSCULAR; INTRAVENOUS at 01:03

## 2024-03-18 RX ADMIN — OXYCODONE 5 MG: 5 TABLET ORAL at 03:03

## 2024-03-18 RX ADMIN — KETOROLAC TROMETHAMINE 30 MG: 30 INJECTION, SOLUTION INTRAMUSCULAR; INTRAVENOUS at 02:03

## 2024-03-18 RX ADMIN — Medication 50 MG: at 01:03

## 2024-03-18 RX ADMIN — ROCURONIUM BROMIDE 50 MG: 10 SOLUTION INTRAVENOUS at 01:03

## 2024-03-18 RX ADMIN — FAMOTIDINE 20 MG: 20 TABLET ORAL at 10:03

## 2024-03-18 RX ADMIN — LIDOCAINE HYDROCHLORIDE 60 MG: 20 INJECTION, SOLUTION INTRAVENOUS at 01:03

## 2024-03-18 RX ADMIN — HYDROMORPHONE HYDROCHLORIDE 0.4 MG: 2 INJECTION INTRAMUSCULAR; INTRAVENOUS; SUBCUTANEOUS at 03:03

## 2024-03-18 RX ADMIN — MIDAZOLAM HYDROCHLORIDE 2 MG: 1 INJECTION, SOLUTION INTRAMUSCULAR; INTRAVENOUS at 12:03

## 2024-03-18 RX ADMIN — Medication 150 MG: at 01:03

## 2024-03-18 RX ADMIN — SODIUM CHLORIDE, SODIUM LACTATE, POTASSIUM CHLORIDE, AND CALCIUM CHLORIDE: 600; 310; 30; 20 INJECTION, SOLUTION INTRAVENOUS at 02:03

## 2024-03-18 RX ADMIN — FENTANYL CITRATE 50 MCG: 50 INJECTION, SOLUTION INTRAMUSCULAR; INTRAVENOUS at 02:03

## 2024-03-18 RX ADMIN — SODIUM CHLORIDE, SODIUM LACTATE, POTASSIUM CHLORIDE, AND CALCIUM CHLORIDE: 600; 310; 30; 20 INJECTION, SOLUTION INTRAVENOUS at 12:03

## 2024-03-18 RX ADMIN — PREGABALIN 75 MG: 75 CAPSULE ORAL at 10:03

## 2024-03-18 RX ADMIN — FENTANYL CITRATE 100 MCG: 50 INJECTION, SOLUTION INTRAMUSCULAR; INTRAVENOUS at 01:03

## 2024-03-18 RX ADMIN — ONDANSETRON 8 MG: 8 TABLET, ORALLY DISINTEGRATING ORAL at 06:03

## 2024-03-18 RX ADMIN — DEXMEDETOMIDINE HYDROCHLORIDE 10 MCG: 100 INJECTION, SOLUTION, CONCENTRATE INTRAVENOUS at 01:03

## 2024-03-18 RX ADMIN — FENTANYL CITRATE 50 MCG: 50 INJECTION, SOLUTION INTRAMUSCULAR; INTRAVENOUS at 03:03

## 2024-03-18 RX ADMIN — ONDANSETRON HYDROCHLORIDE 4 MG: 2 INJECTION INTRAMUSCULAR; INTRAVENOUS at 01:03

## 2024-03-18 RX ADMIN — SUGAMMADEX 200 MG: 100 INJECTION, SOLUTION INTRAVENOUS at 03:03

## 2024-03-18 RX ADMIN — MIDAZOLAM HYDROCHLORIDE 2 MG: 1 INJECTION, SOLUTION INTRAMUSCULAR; INTRAVENOUS at 01:03

## 2024-03-18 RX ADMIN — ACETAMINOPHEN 1000 MG: 500 TABLET ORAL at 10:03

## 2024-03-18 NOTE — OP NOTE
[facility]  Surgery Department  Operative Note    SUMMARY     Date of Procedure: 3/18/2024     Procedure: Procedure(s) (LRB):  XI ROBOTIC CERCLAGE, CERVIX (N/A)  HYSTEROSCOPY (N/A)  Xi Robotic EXCISION, CYST, PARATUBAL, (Left)     Surgeon(s) and Role:     * Lalo Soriano IV, MD - Primary  Katherine Boecking, PGY 4     Pre-Operative Diagnosis: Incompetence of cervix [N88.3]  Personal history of pre-term labor [Z87.51]    Post-Operative Diagnosis: Post-Op Diagnosis Codes:     * Incompetence of cervix [N88.3]     * Personal history of pre-term labor [Z87.51]    Anesthesia: General endotracheal anesthesia    Technical Procedures Used:   1. Robotic assisted abdominal cerclage placement  2. Robotic assisted left paratubal cyst removal/excision  3. Hysteroscopy with no evidence of intrauterine mass or intrauterine Mersilene tape or intra cervical Mersilene tape     Description of the Findings of the Procedure:   1. Liver edge, gallbladder, and cardiac window with no visualized abnormality.  2. Uterus is 8-10 week size with normal-appearing bilateral ovaries.  Bilateral fallopian tubes appeared normal with a left paratubal cyst (2 cm) twisted on its stalk multiple times.  This paratubal cyst was removed without complication  3.  5 mm Mersilene tape placed at cervical uterine junction without complication.  4.  Minimal blood loss was noted post placement of Mersilene tape.  No evidence of uterine artery bleeding noted post placement.  5.  Hysteroscopy revealed no evidence of intrauterine intracervical Mersilene tape.  Bilateral ostia visualized without intrauterine mass noted.  Hysteroscopy fluids normal saline with minimal loss    Operative report:  Franko Moore is a 33-year-old patient with poor obstetric history/history of incompetent cervix.  Risks, benefits, and alternatives to robotic assisted abdominal cerclage placement reviewed in detail with patient during her preop visit.  Consents were  signed patient was taken to the operating room today where she underwent successful general endotracheal anesthesia.  Patient was prepped and draped in the usual sterile fashion for abdominal/pelvic surgery.  Sterile placement of Jeter catheter was performed.  A DIO uterine manipulator without a KOH ring was placed.  Post DIO placement attention was turned to the abdomen where Veress needle was placed through the umbilicus.  Correct placement was confirmed with the hanging drop test and low intra-abdominal pressures.  The abdomen and pelvis were insufflated with CO2 gas.  Post insufflation attention was turned to port placement.  An 8 mm umbilical incision was made with a scalpel.  An 8 mm camera port was placed without complication.  Two 8 mm lateral, abdominal quadrant ports were placed under direct visualization for total of 3 ports being used for this procedure.  General inspection was performed with the findings as reported above.  Patient was placed in deep Trendelenburg and the robot was docked.  Decision was made to proceed with minimally invasive/robotic abdominal cerclage placement.  5 mm Mersilene tape on large cutting needle was placed through the 8 mm port.  The needle was straightened by me prior to placement through the port.  This needle was then placed from anterior to posterior on the left, then posterior to anterior on the right at the level of the cervicouterine junction.  Bimanual exam prior to this procedure revealed a shortened cervix digitally.  The cervicouterine junction was identified with the uterine vessels entered the uterus from the parametrium.  The 5 mm Mersilene tape needle placement was just medial to the uterine vessel entry point.  Excellent placement of the 5 mm stitch was noted intraoperatively.  This was confirmed with the pictures as seen below.  Minimal blood loss was noted during the procedure.  Minor bleeding was noted from the needle placement sites.  The 5 mm Mersilene  tape was tied ensuring the tape was laid flat posteriorly.  Again, see pictures below for details.  After the Mersilene stitch was tied anteriorly and sq fashion, the pelvis copiously irrigated with normal saline and fluid removed via suction .  Minor blood in the pelvic cavities had previously been removed via suction  with minimal blood loss being noted during this procedure..  Minor bleeding from the needle sites was rendered hemostatic using the fenestrated bipolar cautery with minimal energy being used.  Hysteroscopy was performed to ensure proper placement/placement not entering into the cervical canal or uterine cavity.  Hysteroscopy was performed without evidence of any Mersilene tape coming into these areas.  Normal saline was the medium used for hysteroscopy.  Minimal fluid loss was noted post hysteroscopy.  There was no intrauterine mass with bilateral ostia visualized without complication.  At this point hysteroscopy was completed.  DIO uterine manipulator was placed back in the cavity.  Careful inspection was performed robotically with excellent hemostasis being noted.  As noted above, a left paratubal cyst was noted.  This cyst was approximately 2 cm and had twisted around the stalk multiple times.  The decision was made to remove this cyst.  The cyst was moved at the stalk base without complication.  Fenestrated bipolar cautery was used at the base with no damage to surrounding tube.  The stalk was then cut and the specimen was removed and will be sent to pathology for analysis.  At this point procedure was terminated.  CO2 gas was released.  Ports were removed.  Skin sites were closed using 4 Monocryl suture subcuticular fashion.  The DIO uterine manipulator was removed.  Digital exam was performed with nothing in the vagina being noted.  Patient was extubated and taken to the PACU in good condition.  All counts were correct and there were no complications.    Operative  pictures:                                        Significant Surgical Tasks Conducted by the Assistant(s), if Applicable: N/A    Complications: No    Estimated Blood Loss (EBL): * No values recorded between 3/18/2024  1:40 PM and 3/18/2024  2:58 PM *           Implants:  5 mm Mersilene tape/robotic assisted abdominal cerclage    Specimens:   1. Left paratubal cyst              Condition: Good    Disposition: PACU - hemodynamically stable.    Attestation:  I was present performed this entire procedure.    Lalo Soriano IV, MD

## 2024-03-18 NOTE — TRANSFER OF CARE
"Anesthesia Transfer of Care Note    Patient: Franko Moore    Procedure(s) Performed: Procedure(s) (LRB):  XI ROBOTIC CERCLAGE, CERVIX (N/A)  HYSTEROSCOPY (N/A)  Xi Robotic EXCISION, CYST, PARATUBAL, (Left)    Patient location: PACU    Anesthesia Type: general    Transport from OR: Transported from OR on 6-10 L/min O2 by face mask with adequate spontaneous ventilation    Post pain: adequate analgesia    Post assessment: no apparent anesthetic complications and tolerated procedure well    Post vital signs: stable    Level of consciousness: awake and alert    Nausea/Vomiting: no nausea/vomiting    Complications: none    Transfer of care protocol was followed      Last vitals: Visit Vitals  /75 (BP Location: Right arm, Patient Position: Sitting)   Pulse 76   Temp 36.6 °C (97.8 °F) (Oral)   Resp 16   Ht 5' 5" (1.651 m)   Wt 82.1 kg (181 lb)   SpO2 100%   Breastfeeding No   BMI 30.12 kg/m²     "

## 2024-03-18 NOTE — DISCHARGE INSTRUCTIONS

## 2024-03-18 NOTE — INTERVAL H&P NOTE
Franko Moore is 33 y.o.  presenting for scheduled RA-abdominal cerclage.    Temp:  [97.8 °F (36.6 °C)] 97.8 °F (36.6 °C)  Pulse:  [76] 76  Resp:  [16] 16  SpO2:  [100 %] 100 %  BP: (110)/(75) 110/75    General: NAD, alert, oriented, cooperative  HEENT: NCAT, EOM grossly intact  Lungs: Normal WOB  Heart: regular rate  Abdomen: soft, nondistended, nontender, no rebound or guarding    Consents in chart. Pre-operative heparin not indicated. All questions answered and concerns addressed. To OR for planned procedure.          There are no hospital problems to display for this patient.      Lalo Soriano IV, MD

## 2024-03-18 NOTE — ANESTHESIA POSTPROCEDURE EVALUATION
Anesthesia Post Evaluation    Patient: Frnako Moore    Procedure(s) Performed: Procedure(s) (LRB):  XI ROBOTIC CERCLAGE, CERVIX (N/A)  HYSTEROSCOPY (N/A)  Xi Robotic EXCISION, CYST, PARATUBAL, (Left)    Final Anesthesia Type: general      Patient location during evaluation: PACU  Patient participation: Yes- Able to Participate  Level of consciousness: awake and alert  Post-procedure vital signs: reviewed and stable  Pain management: adequate  Airway patency: patent    PONV status at discharge: No PONV  Anesthetic complications: no      Cardiovascular status: blood pressure returned to baseline  Respiratory status: spontaneous ventilation  Hydration status: euvolemic  Follow-up not needed.          Vitals Value Taken Time   /64 03/18/24 1630   Temp 36.8 °C (98.2 °F) 03/18/24 1630   Pulse 76 03/18/24 1630   Resp 18 03/18/24 1630   SpO2 99 % 03/18/24 1630         Event Time   Out of Recovery 16:13:00         Pain/Radha Score: Pain Rating Prior to Med Admin: 8 (3/18/2024  3:44 PM)  Pain Rating Post Med Admin: 1 (3/18/2024  4:30 PM)  Radha Score: 10 (3/18/2024  4:30 PM)

## 2024-03-18 NOTE — DISCHARGE SUMMARY
"Discharge Summary  Gynecology      Admit Date: 3/18/2024    Discharge Date and Time: 3/18/2024     Attending Physician: Lalo Soriano IV, MD    Principal Diagnoses: Status post robot-assisted surgical procedure    Active Hospital Problems    Diagnosis  POA    *S/p RA-abdominal cerclage [Z98.890]  Not Applicable      Resolved Hospital Problems   No resolved problems to display.       Procedures: Procedure(s) (LRB):  XI ROBOTIC CERCLAGE, CERVIX (N/A)  HYSTEROSCOPY, WITH DILATION AND CURETTAGE OF UTERUS (N/A)    Discharged Condition: good    Hospital Course:   Consents were signed with patient. Patient was transported to OR for procedure(s) listed above. Please see OP note for further details. Tolerated procedure well and patient was taken to recovery in a stable condition. Prior to discharge patient was able to void, ambulate, tolerate PO and pain was well controlled with PO meds. Patient was given routine post-op instructions for which patient voiced understanding. Patient was subsequently discharged.    Consults: None    Significant Diagnostic Studies:  No results for input(s): "WBC", "HGB", "HCT", "MCV", "PLT" in the last 168 hours.     Treatments:   1. Surgery as above    Disposition: Home or Self Care    Patient Instructions:   Current Discharge Medication List        START taking these medications    Details   acetaminophen (TYLENOL) 650 MG TbSR Take 1 tablet (650 mg total) by mouth every 6 to 8 hours as needed (Alternate every 3 hours with ibuprofen).  Qty: 60 tablet, Refills: 1      docusate sodium (COLACE) 100 MG capsule Take 1 capsule (100 mg total) by mouth 2 (two) times daily as needed for Constipation.  Qty: 60 capsule, Refills: 0      ibuprofen (ADVIL,MOTRIN) 600 MG tablet Take 1 tablet (600 mg total) by mouth every 6 to 8 hours as needed for Pain (Alternate every 3 hours with Tylenol.).  Qty: 60 tablet, Refills: 1      oxyCODONE (ROXICODONE) 5 MG immediate release tablet Take 1 tablet (5 mg total) " by mouth every 4 (four) hours as needed for Pain.  Qty: 15 tablet, Refills: 0    Comments: Quantity prescribed more than 7 day supply? No           CONTINUE these medications which have NOT CHANGED    Details   SPIRONOLACTONE ORAL Take 100 mg by mouth Daily. ACNE      JUNEL FE 24 1 mg-20 mcg (24)/75 mg (4) per tablet Take 1 tablet by mouth.             Discharge Procedure Orders   Remove dressing in 24 hours   Order Comments: If you have a bandage on wound, you may remove it the day after dismissal.  If you had steri-strips remove them once they begin to peel off (usually 2 weeks). Keep incision clean and dry.  Inspect the incision daily for signs and symptoms of infection.     Wound care routine (specify)   Order Comments: WOUND CARE:  If you have a band-aid or bandage on your wound, you may remove it the day after dismissal.  If you had steri-strips remove them once they begin to peel off (usually 2 weeks).  If your steri-strips still haven't come off in 2 weeks, please remove them. You may wash the wound with mild soap and water.   You may shower at any time but should avoid immersing any abdominal incisions in water for at least two weeks after surgery or until the wound is completely healed. If given, please shower with Hibiclens soap until bottle is completely finished. Keep your wound clean and dry.  You should observe your incision for signs of infection which include redness, warmth, drainage or fever.     Call MD for:  temperature >100.4     Call MD for:  persistent nausea and vomiting     Call MD for:  severe uncontrolled pain     Call MD for:  difficulty breathing, headache or visual disturbances     Call MD for:  redness, tenderness, or signs of infection (pain, swelling, redness, odor or green/yellow discharge around incision site)     Call MD for:  hives     Call MD for:   Order Comments: Inability to void,urine is ketchup colored or you have large clots, vaginal bleeding is heavier than a  period.    CONSTIPATION REMEDIES: Patients are often constipated after surgery or with use of oral narcotic medicine. You should continue to take the stool softener, Senokot-S during the next six weeks, and consume adequate amounts of water.  If you have not had a bowel movement for 3 days after dismissal, or are uncomfortable and unable to pass stool, please try one or all of the following measures:  1.  Milk of Magnesia - 30 cc by mouth every 12 hours   2.  Dulcolax suppository - One suppository per rectum every 4-6 hours   3.  Metamucil, Fibercon or other bulk former - use as directed  4.  Fleets Enema        PAIN MEDICATIONS:     Take your pain medications as instructed. It is best to take pain medications before your pain becomes severe. This will allow you to take less medication yet have better pain relief. For the first 2 or 3 days it may be helpful to take your pain medications on a regular schedule (e.g. every 4 to 6 hours). This will help you to keep your pain under better control. You should then begin to take fewer medications each day until you no longer need them. Do not take pain medication on an empty stomach. This may lead to nausea and vomiting.     Lifting restrictions   Order Comments: No lifting greater than 15 pounds for six weeks.     Activity as tolerated   Order Comments: Return to normal activity slowly as you feel able.  For 6 weeks your exercise should be limited to walking.  You may walk as far as you wish, as long as you increase your level of exertion gradually and avoid slippery surfaces.    If you had a hysterectomy at the surgery do not insert anything in your vagina for 9 weeks.     Shower on day dressing removed (No bath)   Order Comments: You may shower at any time but should avoid immersing any abdominal incisions in water for at least 2 weeks after surgery or until the wound is completely healed.  If given, please shower with Hibaclens soap until bottle is completely finish         Follow-up Information       Lalo Soriano IV, MD Follow up in 6 week(s).    Specialties: Obstetrics, Obstetrics and Gynecology  Why: post op  Contact information:  [address] [address] [phone]                             Garcia Shen MD  OBGYN PGY-2

## 2024-03-18 NOTE — OR NURSING
VSS on RA. Pain is tolerable per pt. X3 lap sites with steri strips, CDI. PIV CDI. Meets PACU discharge criteria. Ready for transfer to phase II. Family notified.

## 2024-03-18 NOTE — ANESTHESIA PROCEDURE NOTES
Intubation    Date/Time: 3/18/2024 1:30 PM    Performed by: Denise Richardson  Authorized by: Trevor Singleton MD    Intubation:     Induction:  Intravenous    Intubated:  Postinduction    Mask Ventilation:  Easy mask    Attempts:  1    Attempted By:  CRNA    Method of Intubation:  Video laryngoscopy    Blade:  Muñoz 3    Laryngeal View Grade: Grade I - full view of cords      Difficult Airway Encountered?: No      Complications:  None    Airway Device:  Oral endotracheal tube    Airway Device Size:  7.5    Style/Cuff Inflation:  Cuffed (inflated to minimal occlusive pressure)    Tube secured:  22    Secured at:  The lips    Placement Verified By:  Capnometry    Complicating Factors:  None    Findings Post-Intubation:  BS equal bilateral and atraumatic/condition of teeth unchanged

## 2024-03-19 ENCOUNTER — PATIENT MESSAGE (OUTPATIENT)
Dept: OBSTETRICS AND GYNECOLOGY | Facility: CLINIC | Age: 34
End: 2024-03-19
Payer: COMMERCIAL

## 2024-03-19 NOTE — OPERATIVE NOTE ADDENDUM
Certification of Assistant at Surgery       Surgery Date: 3/18/2024     Participating Surgeons:  Surgeon(s) and Role:     * Lalo Soriano IV, MD - Primary    Procedures:  Procedure(s) (LRB):  XI ROBOTIC CERCLAGE, CERVIX (N/A)  HYSTEROSCOPY (N/A)  Xi Robotic EXCISION, CYST, PARATUBAL, (Left)    Assistant Surgeon's Certification of Necessity:  I understand that section 1842 (b) (6) (d) of the Social Security Act generally prohibits Medicare Part B reasonable charge payment for the services of assistants at surgery in teaching hospitals when qualified residents are available to furnish such services. I certify that the services for which payment is claimed were medically necessary, and that no qualified resident was available to perform the services. I further understand that these services are subject to post-payment review by the Medicare carrier.      CAITLIN Olivia    03/19/2024  12:46 PM

## 2024-03-19 NOTE — PLAN OF CARE
Franko Moore has met all discharge criteria from Phase II. Vital Signs are stable, ambulating  without difficulty. Discharge instructions given, patient verbalized understanding. Discharged from facility via wheelchair in stable condition.

## 2024-03-20 ENCOUNTER — TELEPHONE (OUTPATIENT)
Dept: OBSTETRICS AND GYNECOLOGY | Facility: CLINIC | Age: 34
End: 2024-03-20
Payer: COMMERCIAL

## 2024-03-20 DIAGNOSIS — Z98.890 HISTORY OF CERVICAL CERCLAGE: Primary | ICD-10-CM

## 2024-03-20 NOTE — TELEPHONE ENCOUNTER
Patient reports doing well passing gas, informed of post op and ultrasound. Patient reports vaginal bleeding lighter then period. Patient to monitor should resolve, informed non uncommon following surgery. Contact office if filling pad with in one hour. Patient verbalize understanding.

## 2024-03-21 LAB
FINAL PATHOLOGIC DIAGNOSIS: NORMAL
GROSS: NORMAL
Lab: NORMAL

## 2024-04-30 ENCOUNTER — OFFICE VISIT (OUTPATIENT)
Dept: OBSTETRICS AND GYNECOLOGY | Facility: CLINIC | Age: 34
End: 2024-04-30
Payer: COMMERCIAL

## 2024-04-30 ENCOUNTER — PROCEDURE VISIT (OUTPATIENT)
Dept: MATERNAL FETAL MEDICINE | Facility: CLINIC | Age: 34
End: 2024-04-30
Payer: COMMERCIAL

## 2024-04-30 VITALS
DIASTOLIC BLOOD PRESSURE: 68 MMHG | WEIGHT: 195.13 LBS | SYSTOLIC BLOOD PRESSURE: 122 MMHG | HEIGHT: 65 IN | BODY MASS INDEX: 32.51 KG/M2

## 2024-04-30 DIAGNOSIS — R39.9 URINARY SYMPTOM OR SIGN: ICD-10-CM

## 2024-04-30 DIAGNOSIS — Z09 SURGERY FOLLOW-UP EXAMINATION: Primary | ICD-10-CM

## 2024-04-30 DIAGNOSIS — Z98.890 HISTORY OF CERVICAL CERCLAGE: ICD-10-CM

## 2024-04-30 DIAGNOSIS — Z98.890 STATUS POST ROBOT-ASSISTED SURGICAL PROCEDURE: ICD-10-CM

## 2024-04-30 LAB
BILIRUB SERPL-MCNC: NEGATIVE MG/DL
BLOOD URINE, POC: NEGATIVE
CLARITY, POC UA: CLEAR
COLOR, POC UA: YELLOW
GLUCOSE UR QL STRIP: NORMAL
KETONES UR QL STRIP: NEGATIVE
LEUKOCYTE ESTERASE URINE, POC: NEGATIVE
NITRITE, POC UA: NEGATIVE
PH, POC UA: 5
PROTEIN, POC: NEGATIVE
SPECIFIC GRAVITY, POC UA: 1.01
UROBILINOGEN, POC UA: NORMAL

## 2024-04-30 PROCEDURE — 76830 TRANSVAGINAL US NON-OB: CPT | Mod: S$GLB,,, | Performed by: OBSTETRICS & GYNECOLOGY

## 2024-04-30 PROCEDURE — 99024 POSTOP FOLLOW-UP VISIT: CPT | Mod: S$GLB,,, | Performed by: OBSTETRICS & GYNECOLOGY

## 2024-04-30 PROCEDURE — 99999 PR PBB SHADOW E&M-EST. PATIENT-LVL III: CPT | Mod: PBBFAC,,, | Performed by: OBSTETRICS & GYNECOLOGY

## 2024-04-30 PROCEDURE — 3074F SYST BP LT 130 MM HG: CPT | Mod: CPTII,S$GLB,, | Performed by: OBSTETRICS & GYNECOLOGY

## 2024-04-30 PROCEDURE — 1159F MED LIST DOCD IN RCRD: CPT | Mod: CPTII,S$GLB,, | Performed by: OBSTETRICS & GYNECOLOGY

## 2024-04-30 PROCEDURE — 81002 URINALYSIS NONAUTO W/O SCOPE: CPT | Mod: S$GLB,,, | Performed by: OBSTETRICS & GYNECOLOGY

## 2024-04-30 PROCEDURE — 3078F DIAST BP <80 MM HG: CPT | Mod: CPTII,S$GLB,, | Performed by: OBSTETRICS & GYNECOLOGY

## 2024-04-30 PROCEDURE — 76856 US EXAM PELVIC COMPLETE: CPT | Mod: S$GLB,,, | Performed by: OBSTETRICS & GYNECOLOGY

## 2024-04-30 NOTE — PROGRESS NOTES
Postop visit    Franko Moore is a 33 y.o.   who presents status post robotic assisted abdominal cerclage placed on 2024 Patient is doing well postoperatively.  No pain.  No bowel complaints.  No fever.  Mild urinary urgency reported.  No other gynecologic complaints today.      No past medical history on file.  Past Surgical History:   Procedure Laterality Date    CERVICAL CERCLAGE  2018    Placed at 15 weeks due to cervical shortening and dilation    EXCISION, CYST, PARATUBAL, LAPAROSCOPIC Left 3/18/2024    Procedure: Xi Robotic EXCISION, CYST, PARATUBAL,;  Surgeon: Lalo Soriano IV, MD;  Location: [facility];  Service: OB/GYN;  Laterality: Left;    HYSTEROSCOPY N/A 3/18/2024    Procedure: HYSTEROSCOPY;  Surgeon: Lalo Soriano IV, MD;  Location: [facility] OR;  Service: OB/GYN;  Laterality: N/A;    VAGINAL DELIVERY      X 1 WITH EPIDURAL    XI ROBOTIC CERCLAGE, CERVIX N/A 3/18/2024    Procedure: XI ROBOTIC CERCLAGE, CERVIX;  Surgeon: Lalo Soriano IV, MD;  Location: [facility];  Service: OB/GYN;  Laterality: N/A;     Family History   Problem Relation Name Age of Onset    Genetic Disorder Neg Hx      Chromosomal disorder Neg Hx      Birth defects Neg Hx      Breast cancer Neg Hx      Colon cancer Neg Hx      Ovarian cancer Neg Hx       Social History     Tobacco Use    Smoking status: Never    Smokeless tobacco: Never   Substance Use Topics    Alcohol use: Never    Drug use: Never     OB History    Para Term  AB Living   2 1 0 1 1 1   SAB IAB Ectopic Multiple Live Births           1      # Outcome Date GA Lbr Zaid/2nd Weight Sex Type Anes PTL Lv   2   28w5d  1.049 kg (2 lb 5 oz) M Vag-Spont  Y KENDELL      Birth Comments: Had cervical dilation at 15 weeks. Rescue cerclage placed. Developed PPROM at 26 weeks. Labored and delivered at 28 weeks   1 AB 2016 16w0d       ND      Birth Comments: Presented with cervical dilation and delivered before cerclage could be placed       BP  "122/68   Ht 5' 5" (1.651 m)   Wt 88.5 kg (195 lb 1.7 oz)   BMI 32.47 kg/m²     ROS:  GENERAL: No fever, chills, fatigability or weight loss.  VULVAR: No pain, no lesions and no itching.  VAGINAL: No relaxation, no itching, no discharge, no abnormal bleeding and no lesions.  ABDOMEN: No abdominal pain. Denies nausea. Denies vomiting. No diarrhea. No constipation  BREAST: Denies pain. No lumps. No discharge.  URINARY: No incontinence, no nocturia, no frequency and no dysuria.  CARDIOVASCULAR: No chest pain. No shortness of breath. No leg cramps.  NEUROLOGICAL: No headaches. No vision changes.    PE:   ABDOMEN:  Soft, non-tender, non-distended.  Laparoscopic/robotic scars healed  PELVIC: Normal external genitalia without lesions.  Normal hair distribution.  Adequate perineal body, normal urethral meatus.  Vagina moist and well rugated without lesions or discharge.  Cervix pink, without lesions, discharge or tenderness.  No significant cystocele or rectocele.  Bimanual exam shows uterus to be normal size, regular, mobile and nontender.  Adnexa without masses or tenderness.        ICD-10-CM ICD-9-CM    1. Surgery follow-up examination  Z09 V67.00       2. S/p RA-abdominal cerclage  Z98.890 V45.89       3. Urinary symptom or sign  R39.9 788.99 POCT URINE DIPSTICK WITHOUT MICROSCOPE        Urine dipstick:  Negative nitrite/negative WBC.    Plan:  Patient is cleared for routine activity  Patient encouraged to continue OCPs for least 3+ months before attempting pregnancy.  Patient verbalized understanding of this recommendation.  Pelvic sonogram performed today to determine placement of M[place]ilene tape.  I am awaiting results at the time of this visit/report.  Keep scheduled follow-up with her OB/GYN (Dr. LARRY Ibrahim) in [place].    Lalo Soriano IV, MD              "

## 2024-09-03 RX ORDER — METFORMIN HYDROCHLORIDE 500 MG/1
500 TABLET ORAL
Qty: 90 TABLET | Refills: 3 | Status: SHIPPED | OUTPATIENT
Start: 2024-09-03 | End: 2025-09-03

## 2024-12-12 ENCOUNTER — PATIENT MESSAGE (OUTPATIENT)
Dept: RESEARCH | Facility: HOSPITAL | Age: 34
End: 2024-12-12
Payer: COMMERCIAL

## 2024-12-27 RX ORDER — MOMETASONE FUROATE 1 MG/G
CREAM TOPICAL DAILY
Qty: 15 G | Refills: 0 | Status: SHIPPED | OUTPATIENT
Start: 2024-12-27

## (undated) DEVICE — TOWEL OR DISP STRL BLUE 4/PK

## (undated) DEVICE — TIP RUMI BLUE DISPOSABLE 5/BX

## (undated) DEVICE — SOL IRRI STRL WATER 1000ML

## (undated) DEVICE — NDL INSUFFLATION VERRES 120MM

## (undated) DEVICE — KIT WING PAD POSITIONING

## (undated) DEVICE — SEAL LENS SCOPE MYOSURE

## (undated) DEVICE — SOL POVIDONE PREP IODINE 4 OZ

## (undated) DEVICE — OBTURATOR BLADELESS 8MM XI CLR

## (undated) DEVICE — JELLY SURGILUBE 5GR

## (undated) DEVICE — SUT MCRYL PLUS 4-0 PS2 27IN

## (undated) DEVICE — SEAL UNIVERSAL 5MM-8MM XI

## (undated) DEVICE — SOL IRR SOD CHL .9% POUR

## (undated) DEVICE — SET BASIN 48X48IN 6000ML RING

## (undated) DEVICE — SOL ELECTROLUBE ANTI-STIC

## (undated) DEVICE — COVER TIP CURVED SCISSORS XI

## (undated) DEVICE — Device

## (undated) DEVICE — SOL POVIDONE SCRUB IODINE 4 OZ

## (undated) DEVICE — SOL NACL IRR 3000ML

## (undated) DEVICE — SUT 5MM 36IN MERSILINED SPE

## (undated) DEVICE — DRAPE ARM DAVINCI XI

## (undated) DEVICE — SOL NORMAL USPCA 0.9%

## (undated) DEVICE — ELECTRODE REM PLYHSV RETURN 9

## (undated) DEVICE — GLOVE SENSICARE PI MICRO 7.5

## (undated) DEVICE — TRAY DO THE ROBOT

## (undated) DEVICE — DRAPE COLUMN DAVINCI XI

## (undated) DEVICE — PACK FLUENT DISPOSABLE

## (undated) DEVICE — SET TRI-LUMEN FILTERED TUBE